# Patient Record
Sex: FEMALE | Race: WHITE | NOT HISPANIC OR LATINO | URBAN - METROPOLITAN AREA
[De-identification: names, ages, dates, MRNs, and addresses within clinical notes are randomized per-mention and may not be internally consistent; named-entity substitution may affect disease eponyms.]

---

## 2017-06-27 ENCOUNTER — IMPORTED ENCOUNTER (OUTPATIENT)
Dept: URBAN - METROPOLITAN AREA CLINIC 50 | Facility: CLINIC | Age: 57
End: 2017-06-27

## 2018-07-17 ENCOUNTER — APPOINTMENT (RX ONLY)
Dept: URBAN - METROPOLITAN AREA CLINIC 57 | Facility: CLINIC | Age: 58
Setting detail: DERMATOLOGY
End: 2018-07-17

## 2018-07-17 DIAGNOSIS — L81.4 OTHER MELANIN HYPERPIGMENTATION: ICD-10-CM

## 2018-07-17 DIAGNOSIS — L57.0 ACTINIC KERATOSIS: ICD-10-CM

## 2018-07-17 DIAGNOSIS — L82.1 OTHER SEBORRHEIC KERATOSIS: ICD-10-CM

## 2018-07-17 DIAGNOSIS — L70.8 OTHER ACNE: ICD-10-CM

## 2018-07-17 DIAGNOSIS — D18.0 HEMANGIOMA: ICD-10-CM

## 2018-07-17 PROBLEM — D18.01 HEMANGIOMA OF SKIN AND SUBCUTANEOUS TISSUE: Status: ACTIVE | Noted: 2018-07-17

## 2018-07-17 PROBLEM — D48.5 NEOPLASM OF UNCERTAIN BEHAVIOR OF SKIN: Status: ACTIVE | Noted: 2018-07-17

## 2018-07-17 PROBLEM — Z85.828 PERSONAL HISTORY OF OTHER MALIGNANT NEOPLASM OF SKIN: Status: ACTIVE | Noted: 2018-07-17

## 2018-07-17 PROCEDURE — ? BIOPSY BY SHAVE METHOD

## 2018-07-17 PROCEDURE — 99213 OFFICE O/P EST LOW 20 MIN: CPT | Mod: 25

## 2018-07-17 PROCEDURE — 17000 DESTRUCT PREMALG LESION: CPT

## 2018-07-17 PROCEDURE — 11100: CPT | Mod: 59

## 2018-07-17 PROCEDURE — ? COUNSELING

## 2018-07-17 PROCEDURE — ? LIQUID NITROGEN

## 2018-07-17 PROCEDURE — 17003 DESTRUCT PREMALG LES 2-14: CPT

## 2018-07-17 ASSESSMENT — LOCATION DETAILED DESCRIPTION DERM
LOCATION DETAILED: LEFT SUPERIOR PREAURICULAR CHEEK
LOCATION DETAILED: LEFT INFERIOR LATERAL FOREHEAD
LOCATION DETAILED: LEFT SUPERIOR LATERAL MALAR CHEEK
LOCATION DETAILED: LEFT INFERIOR LATERAL MALAR CHEEK
LOCATION DETAILED: EPIGASTRIC SKIN
LOCATION DETAILED: LEFT CENTRAL ZYGOMA
LOCATION DETAILED: RIGHT FOREHEAD
LOCATION DETAILED: LEFT MID PREAURICULAR CHEEK
LOCATION DETAILED: LEFT LATERAL TEMPLE

## 2018-07-17 ASSESSMENT — LOCATION SIMPLE DESCRIPTION DERM
LOCATION SIMPLE: RIGHT FOREHEAD
LOCATION SIMPLE: LEFT CHEEK
LOCATION SIMPLE: ABDOMEN
LOCATION SIMPLE: LEFT TEMPLE
LOCATION SIMPLE: LEFT FOREHEAD
LOCATION SIMPLE: LEFT ZYGOMA

## 2018-07-17 ASSESSMENT — LOCATION ZONE DERM
LOCATION ZONE: FACE
LOCATION ZONE: TRUNK

## 2018-07-17 NOTE — PROCEDURE: MIPS QUALITY
Quality 226: Preventive Care And Screening: Tobacco Use: Screening And Cessation Intervention: Patient screened for tobacco and never smoked
Quality 110: Preventive Care And Screening: Influenza Immunization: Influenza Immunization Administered during Influenza season
Detail Level: Detailed
Quality 431: Preventive Care And Screening: Unhealthy Alcohol Use - Screening: Patient screened for unhealthy alcohol use using a single question and scores 2 or greater episodes per year and brief intervention occurred
Quality 111:Pneumonia Vaccination Status For Older Adults: Pneumococcal Vaccination Previously Received

## 2018-07-17 NOTE — PROCEDURE: LIQUID NITROGEN
Duration Of Freeze Thaw-Cycle (Seconds): 5
Consent: The patient's consent was obtained including but not limited to risks of crusting, scabbing, blistering, scarring, darker or lighter pigmentary change, recurrence, incomplete removal and infection.
Detail Level: Zone
Render Post-Care Instructions In Note?: no
Post-Care Instructions: I reviewed with the patient in detail post-care instructions. Patient is to wear sunprotection, and avoid picking at any of the treated lesions. Pt may apply Vaseline to crusted or scabbing areas.
Number Of Freeze-Thaw Cycles: 3 freeze-thaw cycles

## 2018-08-03 ENCOUNTER — APPOINTMENT (RX ONLY)
Dept: URBAN - METROPOLITAN AREA CLINIC 57 | Facility: CLINIC | Age: 58
Setting detail: DERMATOLOGY
End: 2018-08-03

## 2018-08-03 DIAGNOSIS — D22 MELANOCYTIC NEVI: ICD-10-CM

## 2018-08-03 PROBLEM — D22.62 MELANOCYTIC NEVI OF LEFT UPPER LIMB, INCLUDING SHOULDER: Status: ACTIVE | Noted: 2018-08-03

## 2018-08-03 PROCEDURE — 11403 EXC TR-EXT B9+MARG 2.1-3CM: CPT

## 2018-08-03 PROCEDURE — 13121 CMPLX RPR S/A/L 2.6-7.5 CM: CPT

## 2018-08-03 PROCEDURE — ? EXCISION

## 2018-08-03 ASSESSMENT — LOCATION ZONE DERM: LOCATION ZONE: ARM

## 2018-08-03 ASSESSMENT — LOCATION DETAILED DESCRIPTION DERM: LOCATION DETAILED: LEFT DISTAL DORSAL FOREARM

## 2018-08-03 ASSESSMENT — LOCATION SIMPLE DESCRIPTION DERM: LOCATION SIMPLE: LEFT FOREARM

## 2019-01-22 ENCOUNTER — APPOINTMENT (RX ONLY)
Dept: URBAN - METROPOLITAN AREA CLINIC 57 | Facility: CLINIC | Age: 59
Setting detail: DERMATOLOGY
End: 2019-01-22

## 2019-01-22 DIAGNOSIS — L82.1 OTHER SEBORRHEIC KERATOSIS: ICD-10-CM

## 2019-01-22 DIAGNOSIS — L57.0 ACTINIC KERATOSIS: ICD-10-CM

## 2019-01-22 DIAGNOSIS — L81.4 OTHER MELANIN HYPERPIGMENTATION: ICD-10-CM

## 2019-01-22 DIAGNOSIS — D22 MELANOCYTIC NEVI: ICD-10-CM

## 2019-01-22 DIAGNOSIS — Z85.828 PERSONAL HISTORY OF OTHER MALIGNANT NEOPLASM OF SKIN: ICD-10-CM

## 2019-01-22 DIAGNOSIS — L57.3 POIKILODERMA OF CIVATTE: ICD-10-CM

## 2019-01-22 PROBLEM — D22.5 MELANOCYTIC NEVI OF TRUNK: Status: ACTIVE | Noted: 2019-01-22

## 2019-01-22 PROCEDURE — ? COUNSELING

## 2019-01-22 PROCEDURE — ? LIQUID NITROGEN

## 2019-01-22 PROCEDURE — 17003 DESTRUCT PREMALG LES 2-14: CPT

## 2019-01-22 PROCEDURE — 17000 DESTRUCT PREMALG LESION: CPT

## 2019-01-22 PROCEDURE — 99214 OFFICE O/P EST MOD 30 MIN: CPT | Mod: 25

## 2019-01-22 ASSESSMENT — LOCATION ZONE DERM
LOCATION ZONE: TRUNK
LOCATION ZONE: NOSE
LOCATION ZONE: FACE

## 2019-01-22 ASSESSMENT — LOCATION SIMPLE DESCRIPTION DERM
LOCATION SIMPLE: RIGHT ZYGOMA
LOCATION SIMPLE: LEFT CHEEK
LOCATION SIMPLE: UPPER BACK
LOCATION SIMPLE: RIGHT EYEBROW
LOCATION SIMPLE: NOSE

## 2019-01-22 ASSESSMENT — LOCATION DETAILED DESCRIPTION DERM
LOCATION DETAILED: RIGHT CENTRAL EYEBROW
LOCATION DETAILED: LEFT MEDIAL MALAR CHEEK
LOCATION DETAILED: NASAL DORSUM
LOCATION DETAILED: SUPERIOR THORACIC SPINE
LOCATION DETAILED: RIGHT MEDIAL ZYGOMA

## 2019-01-22 NOTE — PROCEDURE: LIQUID NITROGEN
Render Post-Care Instructions In Note?: no
Number Of Freeze-Thaw Cycles: 3 freeze-thaw cycles
Consent: The patient's consent was obtained including but not limited to risks of crusting, scabbing, blistering, scarring, darker or lighter pigmentary change, recurrence, incomplete removal and infection.
Detail Level: Zone
Duration Of Freeze Thaw-Cycle (Seconds): 3
Post-Care Instructions: I reviewed with the patient in detail post-care instructions. Patient is to wear sunprotection, and avoid picking at any of the treated lesions. Pt may apply Vaseline to crusted or scabbing areas.

## 2019-03-15 ENCOUNTER — IMPORTED ENCOUNTER (OUTPATIENT)
Dept: URBAN - METROPOLITAN AREA CLINIC 50 | Facility: CLINIC | Age: 59
End: 2019-03-15

## 2019-03-25 ENCOUNTER — IMPORTED ENCOUNTER (OUTPATIENT)
Dept: URBAN - METROPOLITAN AREA CLINIC 50 | Facility: CLINIC | Age: 59
End: 2019-03-25

## 2019-07-16 ENCOUNTER — APPOINTMENT (RX ONLY)
Dept: URBAN - METROPOLITAN AREA CLINIC 57 | Facility: CLINIC | Age: 59
Setting detail: DERMATOLOGY
End: 2019-07-16

## 2019-07-16 DIAGNOSIS — D18.0 HEMANGIOMA: ICD-10-CM

## 2019-07-16 DIAGNOSIS — L82.1 OTHER SEBORRHEIC KERATOSIS: ICD-10-CM

## 2019-07-16 DIAGNOSIS — Z85.828 PERSONAL HISTORY OF OTHER MALIGNANT NEOPLASM OF SKIN: ICD-10-CM

## 2019-07-16 DIAGNOSIS — L57.8 OTHER SKIN CHANGES DUE TO CHRONIC EXPOSURE TO NONIONIZING RADIATION: ICD-10-CM

## 2019-07-16 DIAGNOSIS — D22 MELANOCYTIC NEVI: ICD-10-CM

## 2019-07-16 DIAGNOSIS — L81.4 OTHER MELANIN HYPERPIGMENTATION: ICD-10-CM

## 2019-07-16 DIAGNOSIS — L57.0 ACTINIC KERATOSIS: ICD-10-CM

## 2019-07-16 PROBLEM — D18.01 HEMANGIOMA OF SKIN AND SUBCUTANEOUS TISSUE: Status: ACTIVE | Noted: 2019-07-16

## 2019-07-16 PROBLEM — D22.9 MELANOCYTIC NEVI, UNSPECIFIED: Status: ACTIVE | Noted: 2019-07-16

## 2019-07-16 PROCEDURE — ? LIQUID NITROGEN

## 2019-07-16 PROCEDURE — 17000 DESTRUCT PREMALG LESION: CPT

## 2019-07-16 PROCEDURE — 17003 DESTRUCT PREMALG LES 2-14: CPT

## 2019-07-16 PROCEDURE — ? COUNSELING

## 2019-07-16 PROCEDURE — ? INVENTORY

## 2019-07-16 PROCEDURE — 99214 OFFICE O/P EST MOD 30 MIN: CPT | Mod: 25

## 2019-07-16 ASSESSMENT — LOCATION DETAILED DESCRIPTION DERM
LOCATION DETAILED: LEFT LATERAL EYEBROW
LOCATION DETAILED: PERIUMBILICAL SKIN
LOCATION DETAILED: RIGHT INFERIOR UPPER BACK
LOCATION DETAILED: RIGHT INFERIOR FOREHEAD
LOCATION DETAILED: LEFT LATERAL MALAR CHEEK
LOCATION DETAILED: PHILTRUM

## 2019-07-16 ASSESSMENT — LOCATION SIMPLE DESCRIPTION DERM
LOCATION SIMPLE: RIGHT UPPER BACK
LOCATION SIMPLE: ABDOMEN
LOCATION SIMPLE: UPPER LIP
LOCATION SIMPLE: LEFT EYEBROW
LOCATION SIMPLE: RIGHT FOREHEAD
LOCATION SIMPLE: LEFT CHEEK

## 2019-07-16 ASSESSMENT — LOCATION ZONE DERM
LOCATION ZONE: TRUNK
LOCATION ZONE: LIP
LOCATION ZONE: FACE

## 2019-07-16 NOTE — PROCEDURE: MIPS QUALITY
Quality 111:Pneumonia Vaccination Status For Older Adults: Pneumococcal Vaccination Previously Received
Quality 226: Preventive Care And Screening: Tobacco Use: Screening And Cessation Intervention: Patient screened for tobacco use and is an ex/non-smoker
Detail Level: Detailed
Quality 130: Documentation Of Current Medications In The Medical Record: Current Medications Documented
Quality 431: Preventive Care And Screening: Unhealthy Alcohol Use - Screening: Patient screened for unhealthy alcohol use using a single question and scores less than 2 times per year
Quality 110: Preventive Care And Screening: Influenza Immunization: Influenza Immunization previously received during influenza season
Quality 474: Zoster Vaccination Status: Shingrix vaccine was not administered for reasons documented by clinician (e.g. patient administered vaccine other than Shingrix, patient allergy or other medical reasons, patient declined or other patient reasons, vaccine not available or other system reasons)

## 2020-01-23 ENCOUNTER — APPOINTMENT (RX ONLY)
Dept: URBAN - METROPOLITAN AREA CLINIC 57 | Facility: CLINIC | Age: 60
Setting detail: DERMATOLOGY
End: 2020-01-23

## 2020-01-23 DIAGNOSIS — L81.4 OTHER MELANIN HYPERPIGMENTATION: ICD-10-CM

## 2020-01-23 DIAGNOSIS — L82.1 OTHER SEBORRHEIC KERATOSIS: ICD-10-CM

## 2020-01-23 DIAGNOSIS — L57.8 OTHER SKIN CHANGES DUE TO CHRONIC EXPOSURE TO NONIONIZING RADIATION: ICD-10-CM

## 2020-01-23 DIAGNOSIS — D18.0 HEMANGIOMA: ICD-10-CM

## 2020-01-23 DIAGNOSIS — L57.0 ACTINIC KERATOSIS: ICD-10-CM

## 2020-01-23 DIAGNOSIS — Z85.828 PERSONAL HISTORY OF OTHER MALIGNANT NEOPLASM OF SKIN: ICD-10-CM

## 2020-01-23 DIAGNOSIS — D22 MELANOCYTIC NEVI: ICD-10-CM

## 2020-01-23 PROBLEM — D22.9 MELANOCYTIC NEVI, UNSPECIFIED: Status: ACTIVE | Noted: 2020-01-23

## 2020-01-23 PROBLEM — D18.01 HEMANGIOMA OF SKIN AND SUBCUTANEOUS TISSUE: Status: ACTIVE | Noted: 2020-01-23

## 2020-01-23 PROCEDURE — 17000 DESTRUCT PREMALG LESION: CPT

## 2020-01-23 PROCEDURE — 99214 OFFICE O/P EST MOD 30 MIN: CPT | Mod: 25

## 2020-01-23 PROCEDURE — ? ADDITIONAL NOTES

## 2020-01-23 PROCEDURE — 17003 DESTRUCT PREMALG LES 2-14: CPT

## 2020-01-23 PROCEDURE — ? LIQUID NITROGEN

## 2020-01-23 PROCEDURE — ? FULL BODY SKIN EXAM

## 2020-01-23 PROCEDURE — ? COUNSELING

## 2020-01-23 ASSESSMENT — LOCATION SIMPLE DESCRIPTION DERM
LOCATION SIMPLE: RIGHT EYEBROW
LOCATION SIMPLE: NOSE
LOCATION SIMPLE: ABDOMEN
LOCATION SIMPLE: LEFT CHEEK
LOCATION SIMPLE: RIGHT ZYGOMA
LOCATION SIMPLE: RIGHT UPPER BACK

## 2020-01-23 ASSESSMENT — LOCATION DETAILED DESCRIPTION DERM
LOCATION DETAILED: RIGHT INFERIOR UPPER BACK
LOCATION DETAILED: RIGHT MEDIAL ZYGOMA
LOCATION DETAILED: LEFT INFERIOR LATERAL MALAR CHEEK
LOCATION DETAILED: LEFT INFERIOR CENTRAL MALAR CHEEK
LOCATION DETAILED: LEFT CENTRAL MALAR CHEEK
LOCATION DETAILED: PERIUMBILICAL SKIN
LOCATION DETAILED: NASAL DORSUM
LOCATION DETAILED: RIGHT EYEBROW
LOCATION DETAILED: LEFT SUPERIOR CENTRAL MALAR CHEEK

## 2020-01-23 ASSESSMENT — LOCATION ZONE DERM
LOCATION ZONE: NOSE
LOCATION ZONE: TRUNK
LOCATION ZONE: FACE

## 2020-01-23 NOTE — PROCEDURE: ADDITIONAL NOTES
Detail Level: Simple
Additional Notes: If lesion on the left cheek doesn’t resolve will biopsy next visit.

## 2020-03-31 ENCOUNTER — IMPORTED ENCOUNTER (OUTPATIENT)
Dept: URBAN - METROPOLITAN AREA CLINIC 50 | Facility: CLINIC | Age: 60
End: 2020-03-31

## 2020-07-20 ENCOUNTER — APPOINTMENT (RX ONLY)
Dept: URBAN - METROPOLITAN AREA CLINIC 57 | Facility: CLINIC | Age: 60
Setting detail: DERMATOLOGY
End: 2020-07-20

## 2020-07-20 DIAGNOSIS — L82.1 OTHER SEBORRHEIC KERATOSIS: ICD-10-CM

## 2020-07-20 DIAGNOSIS — L57.0 ACTINIC KERATOSIS: ICD-10-CM

## 2020-07-20 DIAGNOSIS — L81.4 OTHER MELANIN HYPERPIGMENTATION: ICD-10-CM

## 2020-07-20 DIAGNOSIS — Z85.828 PERSONAL HISTORY OF OTHER MALIGNANT NEOPLASM OF SKIN: ICD-10-CM

## 2020-07-20 DIAGNOSIS — D22 MELANOCYTIC NEVI: ICD-10-CM

## 2020-07-20 DIAGNOSIS — L82.0 INFLAMED SEBORRHEIC KERATOSIS: ICD-10-CM

## 2020-07-20 DIAGNOSIS — D18.0 HEMANGIOMA: ICD-10-CM

## 2020-07-20 DIAGNOSIS — L57.8 OTHER SKIN CHANGES DUE TO CHRONIC EXPOSURE TO NONIONIZING RADIATION: ICD-10-CM

## 2020-07-20 PROBLEM — D22.9 MELANOCYTIC NEVI, UNSPECIFIED: Status: ACTIVE | Noted: 2020-07-20

## 2020-07-20 PROBLEM — D18.01 HEMANGIOMA OF SKIN AND SUBCUTANEOUS TISSUE: Status: ACTIVE | Noted: 2020-07-20

## 2020-07-20 PROCEDURE — 99213 OFFICE O/P EST LOW 20 MIN: CPT | Mod: 25

## 2020-07-20 PROCEDURE — 17110 DESTRUCTION B9 LES UP TO 14: CPT | Mod: 59

## 2020-07-20 PROCEDURE — ? COUNSELING

## 2020-07-20 PROCEDURE — ? FULL BODY SKIN EXAM

## 2020-07-20 PROCEDURE — 17004 DESTROY PREMAL LESIONS 15/>: CPT

## 2020-07-20 PROCEDURE — ? LIQUID NITROGEN

## 2020-07-20 PROCEDURE — ? ADDITIONAL NOTES

## 2020-07-20 ASSESSMENT — LOCATION DETAILED DESCRIPTION DERM
LOCATION DETAILED: RIGHT SUPERIOR CENTRAL MALAR CHEEK
LOCATION DETAILED: RIGHT INFERIOR CENTRAL MALAR CHEEK
LOCATION DETAILED: LEFT LATERAL FOREHEAD
LOCATION DETAILED: LEFT LATERAL EYEBROW
LOCATION DETAILED: LEFT INFERIOR CENTRAL MALAR CHEEK
LOCATION DETAILED: PERIUMBILICAL SKIN
LOCATION DETAILED: LEFT CENTRAL ZYGOMA
LOCATION DETAILED: RIGHT INFERIOR MEDIAL FOREHEAD
LOCATION DETAILED: RIGHT INFERIOR UPPER BACK
LOCATION DETAILED: RIGHT CENTRAL TEMPLE
LOCATION DETAILED: LEFT SUPERIOR LATERAL FOREHEAD
LOCATION DETAILED: LEFT CENTRAL TEMPLE
LOCATION DETAILED: RIGHT MEDIAL TEMPLE
LOCATION DETAILED: LEFT ANTERIOR EARLOBE
LOCATION DETAILED: LEFT MEDIAL MALAR CHEEK
LOCATION DETAILED: RIGHT MEDIAL ZYGOMA
LOCATION DETAILED: RIGHT CENTRAL EYEBROW
LOCATION DETAILED: LEFT SUPERIOR FOREHEAD
LOCATION DETAILED: LEFT MEDIAL TEMPLE

## 2020-07-20 ASSESSMENT — LOCATION SIMPLE DESCRIPTION DERM
LOCATION SIMPLE: LEFT FOREHEAD
LOCATION SIMPLE: RIGHT FOREHEAD
LOCATION SIMPLE: RIGHT CHEEK
LOCATION SIMPLE: RIGHT ZYGOMA
LOCATION SIMPLE: LEFT EAR
LOCATION SIMPLE: RIGHT TEMPLE
LOCATION SIMPLE: LEFT EYEBROW
LOCATION SIMPLE: RIGHT EYEBROW
LOCATION SIMPLE: RIGHT UPPER BACK
LOCATION SIMPLE: LEFT ZYGOMA
LOCATION SIMPLE: LEFT TEMPLE
LOCATION SIMPLE: ABDOMEN
LOCATION SIMPLE: LEFT CHEEK

## 2020-07-20 ASSESSMENT — LOCATION ZONE DERM
LOCATION ZONE: EAR
LOCATION ZONE: TRUNK
LOCATION ZONE: FACE

## 2020-07-20 NOTE — PROCEDURE: LIQUID NITROGEN
Detail Level: Zone
Render Post-Care Instructions In Note?: no
Post-Care Instructions: I reviewed with the patient in detail post-care instructions. Patient is to wear sunprotection, and avoid picking at any of the treated lesions. Pt may apply Vaseline to crusted or scabbing areas.
Duration Of Freeze Thaw-Cycle (Seconds): 0
Consent: The patient's consent was obtained including but not limited to risks of crusting, scabbing, blistering, scarring, darker or lighter pigmentary change, recurrence, incomplete removal and infection.
Include Z78.9 (Other Specified Conditions Influencing Health Status) As An Associated Diagnosis?: Yes
Medical Necessity Clause: This procedure was medically necessary because the lesions that were treated were:
Medical Necessity Information: It is in your best interest to select a reason for this procedure from the list below. All of these items fulfill various CMS LCD requirements except the new and changing color options.
Detail Level: Simple

## 2020-07-20 NOTE — PROCEDURE: MIPS QUALITY
Quality 130: Documentation Of Current Medications In The Medical Record: Current Medications Documented
Quality 431: Preventive Care And Screening: Unhealthy Alcohol Use - Screening: Patient screened for unhealthy alcohol use using a single question and scores less than 2 times per year
Detail Level: Simple
Quality 226: Preventive Care And Screening: Tobacco Use: Screening And Cessation Intervention: Tobacco Screening not Performed for Medical Reasons

## 2021-01-25 ENCOUNTER — APPOINTMENT (RX ONLY)
Dept: URBAN - METROPOLITAN AREA CLINIC 57 | Facility: CLINIC | Age: 61
Setting detail: DERMATOLOGY
End: 2021-01-25

## 2021-01-25 DIAGNOSIS — L57.0 ACTINIC KERATOSIS: ICD-10-CM

## 2021-01-25 DIAGNOSIS — D18.0 HEMANGIOMA: ICD-10-CM

## 2021-01-25 DIAGNOSIS — D22 MELANOCYTIC NEVI: ICD-10-CM

## 2021-01-25 DIAGNOSIS — L82.1 OTHER SEBORRHEIC KERATOSIS: ICD-10-CM

## 2021-01-25 DIAGNOSIS — Z85.828 PERSONAL HISTORY OF OTHER MALIGNANT NEOPLASM OF SKIN: ICD-10-CM

## 2021-01-25 DIAGNOSIS — L81.4 OTHER MELANIN HYPERPIGMENTATION: ICD-10-CM

## 2021-01-25 DIAGNOSIS — L57.8 OTHER SKIN CHANGES DUE TO CHRONIC EXPOSURE TO NONIONIZING RADIATION: ICD-10-CM

## 2021-01-25 PROBLEM — D18.01 HEMANGIOMA OF SKIN AND SUBCUTANEOUS TISSUE: Status: ACTIVE | Noted: 2021-01-25

## 2021-01-25 PROBLEM — D22.9 MELANOCYTIC NEVI, UNSPECIFIED: Status: ACTIVE | Noted: 2021-01-25

## 2021-01-25 PROCEDURE — 17003 DESTRUCT PREMALG LES 2-14: CPT

## 2021-01-25 PROCEDURE — ? TREATMENT REGIMEN

## 2021-01-25 PROCEDURE — ? COUNSELING

## 2021-01-25 PROCEDURE — 99213 OFFICE O/P EST LOW 20 MIN: CPT | Mod: 25

## 2021-01-25 PROCEDURE — 17000 DESTRUCT PREMALG LESION: CPT

## 2021-01-25 PROCEDURE — ? LIQUID NITROGEN

## 2021-01-25 PROCEDURE — ? ADDITIONAL NOTES

## 2021-01-25 PROCEDURE — ? FULL BODY SKIN EXAM

## 2021-01-25 ASSESSMENT — LOCATION DETAILED DESCRIPTION DERM
LOCATION DETAILED: LEFT INFERIOR LATERAL MALAR CHEEK
LOCATION DETAILED: PERIUMBILICAL SKIN
LOCATION DETAILED: LEFT LATERAL BUCCAL CHEEK
LOCATION DETAILED: LEFT CENTRAL MANDIBULAR CHEEK
LOCATION DETAILED: RIGHT ANTERIOR SHOULDER
LOCATION DETAILED: RIGHT SUPERIOR LATERAL BUCCAL CHEEK
LOCATION DETAILED: LEFT CENTRAL ZYGOMA
LOCATION DETAILED: RIGHT INFERIOR UPPER BACK
LOCATION DETAILED: LEFT SUPERIOR CENTRAL BUCCAL CHEEK
LOCATION DETAILED: LEFT CENTRAL TEMPLE
LOCATION DETAILED: LEFT LATERAL TEMPLE
LOCATION DETAILED: LEFT SUPERIOR FOREHEAD
LOCATION DETAILED: INFERIOR THORACIC SPINE
LOCATION DETAILED: RIGHT INFERIOR TEMPLE

## 2021-01-25 ASSESSMENT — LOCATION SIMPLE DESCRIPTION DERM
LOCATION SIMPLE: RIGHT SHOULDER
LOCATION SIMPLE: RIGHT CHEEK
LOCATION SIMPLE: LEFT FOREHEAD
LOCATION SIMPLE: UPPER BACK
LOCATION SIMPLE: LEFT CHEEK
LOCATION SIMPLE: ABDOMEN
LOCATION SIMPLE: RIGHT TEMPLE
LOCATION SIMPLE: LEFT ZYGOMA
LOCATION SIMPLE: RIGHT UPPER BACK
LOCATION SIMPLE: LEFT TEMPLE

## 2021-01-25 ASSESSMENT — LOCATION ZONE DERM
LOCATION ZONE: FACE
LOCATION ZONE: ARM
LOCATION ZONE: TRUNK

## 2021-03-25 ENCOUNTER — IMPORTED ENCOUNTER (OUTPATIENT)
Dept: URBAN - METROPOLITAN AREA CLINIC 50 | Facility: CLINIC | Age: 61
End: 2021-03-25

## 2021-04-17 ASSESSMENT — VISUAL ACUITY
OD_CC: 20/30-2
OD_CC: J1+
OS_CC: 20/30-2
OD_BAT: 20/40
OS_CC: J1+@ 15 IN
OD_CC: 20/25-3
OS_CC: 20/20
OS_OTHER: 20/40. >20/400.
OD_CC: 20/25
OD_CC: 20/20-2
OD_CC: J1+@ 17 IN
OS_CC: 20/30-1+1
OS_BAT: 20/40
OS_CC: J1+@ 18 IN
OD_BAT: 20/30
OD_OTHER: 20/30. >20/400.
OS_CC: J1+@ 17 IN
OS_OTHER: 20/40. 20/70.
OD_OTHER: 20/40. 20/100.
OS_BAT: 20/40
OD_CC: J1+@ 15 IN
OS_CC: 20/25-2
OD_CC: J1+@ 18 IN
OS_CC: J1+

## 2021-04-17 ASSESSMENT — PACHYMETRY
OS_CT_UM: 474
OS_CT_UM: 474
OD_CT_UM: 467
OS_CT_UM: 474
OD_CT_UM: 467
OS_CT_UM: 474
OD_CT_UM: 467
OD_CT_UM: 467

## 2021-04-17 ASSESSMENT — TONOMETRY
OD_IOP_MMHG: 18
OS_IOP_MMHG: 15
OD_IOP_MMHG: 13
OS_IOP_MMHG: 15
OS_IOP_MMHG: 12
OD_IOP_MMHG: 19
OS_IOP_MMHG: 19
OS_IOP_MMHG: 13
OS_IOP_MMHG: 19
OD_IOP_MMHG: 14
OD_IOP_MMHG: 11
OD_IOP_MMHG: 15

## 2021-05-13 ENCOUNTER — APPOINTMENT (RX ONLY)
Dept: URBAN - METROPOLITAN AREA CLINIC 57 | Facility: CLINIC | Age: 61
Setting detail: DERMATOLOGY
End: 2021-05-13

## 2021-05-13 DIAGNOSIS — L82.1 OTHER SEBORRHEIC KERATOSIS: ICD-10-CM

## 2021-05-13 DIAGNOSIS — D18.0 HEMANGIOMA: ICD-10-CM

## 2021-05-13 DIAGNOSIS — L57.8 OTHER SKIN CHANGES DUE TO CHRONIC EXPOSURE TO NONIONIZING RADIATION: ICD-10-CM

## 2021-05-13 DIAGNOSIS — Z85.828 PERSONAL HISTORY OF OTHER MALIGNANT NEOPLASM OF SKIN: ICD-10-CM

## 2021-05-13 DIAGNOSIS — L81.4 OTHER MELANIN HYPERPIGMENTATION: ICD-10-CM

## 2021-05-13 DIAGNOSIS — D22 MELANOCYTIC NEVI: ICD-10-CM

## 2021-05-13 DIAGNOSIS — L57.0 ACTINIC KERATOSIS: ICD-10-CM

## 2021-05-13 PROBLEM — D48.5 NEOPLASM OF UNCERTAIN BEHAVIOR OF SKIN: Status: ACTIVE | Noted: 2021-05-13

## 2021-05-13 PROBLEM — D18.01 HEMANGIOMA OF SKIN AND SUBCUTANEOUS TISSUE: Status: ACTIVE | Noted: 2021-05-13

## 2021-05-13 PROBLEM — D22.9 MELANOCYTIC NEVI, UNSPECIFIED: Status: ACTIVE | Noted: 2021-05-13

## 2021-05-13 PROCEDURE — ? FULL BODY SKIN EXAM - DECLINED

## 2021-05-13 PROCEDURE — ? LIQUID NITROGEN

## 2021-05-13 PROCEDURE — 17000 DESTRUCT PREMALG LESION: CPT | Mod: 59

## 2021-05-13 PROCEDURE — 99213 OFFICE O/P EST LOW 20 MIN: CPT | Mod: 25

## 2021-05-13 PROCEDURE — ? TREATMENT REGIMEN

## 2021-05-13 PROCEDURE — ? BIOPSY BY SHAVE METHOD

## 2021-05-13 PROCEDURE — ? FULL BODY SKIN EXAM

## 2021-05-13 PROCEDURE — 17003 DESTRUCT PREMALG LES 2-14: CPT

## 2021-05-13 PROCEDURE — ? COUNSELING

## 2021-05-13 PROCEDURE — 11102 TANGNTL BX SKIN SINGLE LES: CPT

## 2021-05-13 PROCEDURE — ? ADDITIONAL NOTES

## 2021-05-13 ASSESSMENT — LOCATION SIMPLE DESCRIPTION DERM
LOCATION SIMPLE: RIGHT FOREHEAD
LOCATION SIMPLE: RIGHT UPPER BACK
LOCATION SIMPLE: ABDOMEN
LOCATION SIMPLE: RIGHT TEMPLE
LOCATION SIMPLE: RIGHT CHEEK
LOCATION SIMPLE: UPPER BACK
LOCATION SIMPLE: LEFT EAR
LOCATION SIMPLE: NOSE
LOCATION SIMPLE: LEFT CHEEK

## 2021-05-13 ASSESSMENT — LOCATION DETAILED DESCRIPTION DERM
LOCATION DETAILED: LEFT INFERIOR LATERAL MALAR CHEEK
LOCATION DETAILED: RIGHT CENTRAL TEMPLE
LOCATION DETAILED: RIGHT CENTRAL BUCCAL CHEEK
LOCATION DETAILED: RIGHT FOREHEAD
LOCATION DETAILED: PERIUMBILICAL SKIN
LOCATION DETAILED: INFERIOR THORACIC SPINE
LOCATION DETAILED: LEFT INFERIOR HELIX
LOCATION DETAILED: RIGHT INFERIOR CENTRAL MALAR CHEEK
LOCATION DETAILED: NASAL DORSUM
LOCATION DETAILED: LEFT LATERAL MALAR CHEEK
LOCATION DETAILED: RIGHT INFERIOR UPPER BACK
LOCATION DETAILED: LEFT ANTERIOR EARLOBE

## 2021-05-13 ASSESSMENT — LOCATION ZONE DERM
LOCATION ZONE: TRUNK
LOCATION ZONE: NOSE
LOCATION ZONE: EAR
LOCATION ZONE: FACE

## 2021-06-09 ENCOUNTER — APPOINTMENT (RX ONLY)
Dept: URBAN - METROPOLITAN AREA CLINIC 56 | Facility: CLINIC | Age: 61
Setting detail: DERMATOLOGY
End: 2021-06-09

## 2021-06-09 VITALS — DIASTOLIC BLOOD PRESSURE: 66 MMHG | SYSTOLIC BLOOD PRESSURE: 133 MMHG | HEART RATE: 61 BPM

## 2021-06-09 PROBLEM — C44.629 SQUAMOUS CELL CARCINOMA OF SKIN OF LEFT UPPER LIMB, INCLUDING SHOULDER: Status: ACTIVE | Noted: 2021-06-09

## 2021-06-09 PROCEDURE — ? MOHS SURGERY

## 2021-06-09 PROCEDURE — 17311 MOHS 1 STAGE H/N/HF/G: CPT

## 2021-06-09 PROCEDURE — ? REPAIR NOTE

## 2021-06-09 PROCEDURE — ? ADDITIONAL NOTES

## 2021-06-09 PROCEDURE — 13132 CMPLX RPR F/C/C/M/N/AX/G/H/F: CPT

## 2021-06-09 NOTE — PROCEDURE: MOHS SURGERY
Referred To Mid-Level For Closure Text (Leave Blank If You Do Not Want): After obtaining clear surgical margins the patient was sent to Pierce Epstein PA-C for surgical repair.  The patient understands they will receive post-surgical care and follow-up from the mid-level provider.

## 2021-06-09 NOTE — PROCEDURE: MOHS SURGERY
Referred To Plastics For Closure Text (Leave Blank If You Do Not Want): After obtaining clear surgical margins the patient was sent to plastics for surgical repair.  The patient understands they will receive post-surgical care and follow-up from Dr. Kumari.

## 2021-09-08 ENCOUNTER — APPOINTMENT (RX ONLY)
Dept: URBAN - METROPOLITAN AREA CLINIC 57 | Facility: CLINIC | Age: 61
Setting detail: DERMATOLOGY
End: 2021-09-08

## 2021-09-08 DIAGNOSIS — D18.0 HEMANGIOMA: ICD-10-CM

## 2021-09-08 DIAGNOSIS — Z85.828 PERSONAL HISTORY OF OTHER MALIGNANT NEOPLASM OF SKIN: ICD-10-CM

## 2021-09-08 DIAGNOSIS — L57.0 ACTINIC KERATOSIS: ICD-10-CM

## 2021-09-08 PROBLEM — D18.01 HEMANGIOMA OF SKIN AND SUBCUTANEOUS TISSUE: Status: ACTIVE | Noted: 2021-09-08

## 2021-09-08 PROCEDURE — ? FULL BODY SKIN EXAM - DECLINED

## 2021-09-08 PROCEDURE — ? ADDITIONAL NOTES

## 2021-09-08 PROCEDURE — ? COUNSELING

## 2021-09-08 PROCEDURE — 17000 DESTRUCT PREMALG LESION: CPT

## 2021-09-08 PROCEDURE — 99212 OFFICE O/P EST SF 10 MIN: CPT | Mod: 25

## 2021-09-08 PROCEDURE — ? LIQUID NITROGEN

## 2021-09-08 PROCEDURE — 17003 DESTRUCT PREMALG LES 2-14: CPT

## 2021-09-08 ASSESSMENT — LOCATION DETAILED DESCRIPTION DERM
LOCATION DETAILED: RIGHT INFERIOR LATERAL MALAR CHEEK
LOCATION DETAILED: RIGHT PROXIMAL POSTERIOR THIGH
LOCATION DETAILED: LEFT INFERIOR CENTRAL MALAR CHEEK

## 2021-09-08 ASSESSMENT — LOCATION ZONE DERM
LOCATION ZONE: LEG
LOCATION ZONE: FACE

## 2021-09-08 ASSESSMENT — LOCATION SIMPLE DESCRIPTION DERM
LOCATION SIMPLE: RIGHT CHEEK
LOCATION SIMPLE: LEFT CHEEK
LOCATION SIMPLE: RIGHT POSTERIOR THIGH

## 2021-09-08 NOTE — PROCEDURE: MIPS QUALITY
Quality 47: Advance Care Plan: Advance Care Planning discussed and documented; advance care plan or surrogate decision maker documented in the medical record.
Quality 110: Preventive Care And Screening: Influenza Immunization: Influenza Immunization previously received during influenza season
Quality 226: Preventive Care And Screening: Tobacco Use: Screening And Cessation Intervention: Patient screened for tobacco use and is an ex/non-smoker
Quality 130: Documentation Of Current Medications In The Medical Record: Current Medications Documented
Detail Level: Detailed
Quality 431: Preventive Care And Screening: Unhealthy Alcohol Use - Screening: Patient not identified as an unhealthy alcohol user when screened for unhealthy alcohol use using a systematic screening method

## 2021-09-08 NOTE — PROCEDURE: LIQUID NITROGEN
Render Post-Care Instructions In Note?: no
Show Aperture Variable?: Yes
Consent: The patient's consent was obtained including but not limited to risks of crusting, scabbing, blistering, scarring, darker or lighter pigmentary change, recurrence, incomplete removal and infection.
Duration Of Freeze Thaw-Cycle (Seconds): 0
Number Of Freeze-Thaw Cycles: 2 freeze-thaw cycles
Detail Level: Detailed
Post-Care Instructions: I reviewed with the patient in detail post-care instructions. Patient is to wear sunprotection, and avoid picking at any of the treated lesions. Pt may apply Vaseline to crusted or scabbing areas.

## 2021-11-08 ENCOUNTER — APPOINTMENT (RX ONLY)
Dept: URBAN - METROPOLITAN AREA CLINIC 57 | Facility: CLINIC | Age: 61
Setting detail: DERMATOLOGY
End: 2021-11-08

## 2021-11-08 DIAGNOSIS — L57.0 ACTINIC KERATOSIS: ICD-10-CM

## 2021-11-08 DIAGNOSIS — L81.4 OTHER MELANIN HYPERPIGMENTATION: ICD-10-CM

## 2021-11-08 DIAGNOSIS — D22 MELANOCYTIC NEVI: ICD-10-CM

## 2021-11-08 DIAGNOSIS — L82.1 OTHER SEBORRHEIC KERATOSIS: ICD-10-CM

## 2021-11-08 DIAGNOSIS — Z12.83 ENCOUNTER FOR SCREENING FOR MALIGNANT NEOPLASM OF SKIN: ICD-10-CM

## 2021-11-08 DIAGNOSIS — D18.0 HEMANGIOMA: ICD-10-CM

## 2021-11-08 PROBLEM — D18.01 HEMANGIOMA OF SKIN AND SUBCUTANEOUS TISSUE: Status: ACTIVE | Noted: 2021-11-08

## 2021-11-08 PROBLEM — D22.5 MELANOCYTIC NEVI OF TRUNK: Status: ACTIVE | Noted: 2021-11-08

## 2021-11-08 PROCEDURE — ? ADDITIONAL NOTES

## 2021-11-08 PROCEDURE — ? SUNSCREEN RECOMMENDATIONS

## 2021-11-08 PROCEDURE — 17004 DESTROY PREMAL LESIONS 15/>: CPT

## 2021-11-08 PROCEDURE — ? COUNSELING

## 2021-11-08 PROCEDURE — ? LIQUID NITROGEN

## 2021-11-08 PROCEDURE — 99213 OFFICE O/P EST LOW 20 MIN: CPT | Mod: 25

## 2021-11-08 ASSESSMENT — LOCATION DETAILED DESCRIPTION DERM
LOCATION DETAILED: LEFT INFERIOR FOREHEAD
LOCATION DETAILED: LEFT INFERIOR LATERAL MALAR CHEEK
LOCATION DETAILED: LEFT FOREHEAD
LOCATION DETAILED: LEFT PROXIMAL DORSAL FOREARM
LOCATION DETAILED: NASAL DORSUM
LOCATION DETAILED: LEFT LATERAL BUCCAL CHEEK
LOCATION DETAILED: PERIUMBILICAL SKIN
LOCATION DETAILED: LEFT SUPERIOR LATERAL NECK
LOCATION DETAILED: RIGHT RADIAL DORSAL HAND
LOCATION DETAILED: RIGHT SUPERIOR MEDIAL BUCCAL CHEEK
LOCATION DETAILED: RIGHT FOREHEAD
LOCATION DETAILED: RIGHT INFERIOR CENTRAL MALAR CHEEK
LOCATION DETAILED: LEFT LATERAL ZYGOMA
LOCATION DETAILED: RIGHT SUPERIOR LATERAL NECK
LOCATION DETAILED: RIGHT INFERIOR LATERAL MALAR CHEEK
LOCATION DETAILED: RIGHT MEDIAL TEMPLE
LOCATION DETAILED: EPIGASTRIC SKIN
LOCATION DETAILED: LEFT ANTITRAGUS
LOCATION DETAILED: LEFT CENTRAL MALAR CHEEK
LOCATION DETAILED: LEFT INFERIOR MEDIAL UPPER BACK
LOCATION DETAILED: LEFT MEDIAL UPPER BACK
LOCATION DETAILED: RIGHT MEDIAL UPPER BACK
LOCATION DETAILED: RIGHT LATERAL BUCCAL CHEEK
LOCATION DETAILED: LEFT INFERIOR LATERAL FOREHEAD
LOCATION DETAILED: RIGHT CENTRAL ZYGOMA
LOCATION DETAILED: LEFT SUPERIOR CENTRAL BUCCAL CHEEK

## 2021-11-08 ASSESSMENT — LOCATION SIMPLE DESCRIPTION DERM
LOCATION SIMPLE: LEFT FOREARM
LOCATION SIMPLE: RIGHT FOREHEAD
LOCATION SIMPLE: RIGHT ZYGOMA
LOCATION SIMPLE: RIGHT TEMPLE
LOCATION SIMPLE: RIGHT CHEEK
LOCATION SIMPLE: ABDOMEN
LOCATION SIMPLE: NECK
LOCATION SIMPLE: RIGHT UPPER BACK
LOCATION SIMPLE: NOSE
LOCATION SIMPLE: LEFT UPPER BACK
LOCATION SIMPLE: RIGHT HAND
LOCATION SIMPLE: LEFT ZYGOMA
LOCATION SIMPLE: LEFT FOREHEAD
LOCATION SIMPLE: LEFT EAR
LOCATION SIMPLE: LEFT CHEEK

## 2021-11-08 ASSESSMENT — LOCATION ZONE DERM
LOCATION ZONE: HAND
LOCATION ZONE: TRUNK
LOCATION ZONE: NECK
LOCATION ZONE: EAR
LOCATION ZONE: NOSE
LOCATION ZONE: FACE
LOCATION ZONE: ARM

## 2022-02-21 NOTE — PATIENT DISCUSSION
Mom calling 2/18 @ 4:06. Mom states she was instructed to call back with changes. Keri Duran now has fever and more diarrhea. No change to Rx.

## 2022-03-11 ASSESSMENT — VISUAL ACUITY
OU_CC: 20/20
OS_CC: 20/20
OD_CC: 20/25

## 2022-03-14 ENCOUNTER — PREPPED CHART (OUTPATIENT)
Dept: URBAN - METROPOLITAN AREA CLINIC 53 | Facility: CLINIC | Age: 62
End: 2022-03-14

## 2022-05-09 ENCOUNTER — APPOINTMENT (RX ONLY)
Dept: URBAN - METROPOLITAN AREA CLINIC 57 | Facility: CLINIC | Age: 62
Setting detail: DERMATOLOGY
End: 2022-05-09

## 2022-05-09 DIAGNOSIS — L81.4 OTHER MELANIN HYPERPIGMENTATION: ICD-10-CM

## 2022-05-09 DIAGNOSIS — L57.8 OTHER SKIN CHANGES DUE TO CHRONIC EXPOSURE TO NONIONIZING RADIATION: ICD-10-CM

## 2022-05-09 DIAGNOSIS — L57.0 ACTINIC KERATOSIS: ICD-10-CM

## 2022-05-09 DIAGNOSIS — L82.1 OTHER SEBORRHEIC KERATOSIS: ICD-10-CM

## 2022-05-09 DIAGNOSIS — D22 MELANOCYTIC NEVI: ICD-10-CM

## 2022-05-09 DIAGNOSIS — D18.0 HEMANGIOMA: ICD-10-CM

## 2022-05-09 DIAGNOSIS — Z85.828 PERSONAL HISTORY OF OTHER MALIGNANT NEOPLASM OF SKIN: ICD-10-CM

## 2022-05-09 PROBLEM — D22.9 MELANOCYTIC NEVI, UNSPECIFIED: Status: ACTIVE | Noted: 2022-05-09

## 2022-05-09 PROBLEM — D18.01 HEMANGIOMA OF SKIN AND SUBCUTANEOUS TISSUE: Status: ACTIVE | Noted: 2022-05-09

## 2022-05-09 PROCEDURE — ? COUNSELING

## 2022-05-09 PROCEDURE — ? TREATMENT REGIMEN

## 2022-05-09 PROCEDURE — 99213 OFFICE O/P EST LOW 20 MIN: CPT

## 2022-05-09 PROCEDURE — ? ADDITIONAL NOTES

## 2022-05-09 PROCEDURE — ? FULL BODY SKIN EXAM

## 2022-05-09 ASSESSMENT — LOCATION DETAILED DESCRIPTION DERM
LOCATION DETAILED: RIGHT INFERIOR UPPER BACK
LOCATION DETAILED: PERIUMBILICAL SKIN
LOCATION DETAILED: INFERIOR MID FOREHEAD
LOCATION DETAILED: INFERIOR THORACIC SPINE
LOCATION DETAILED: 2ND WEB SPACE LEFT HAND
LOCATION DETAILED: LEFT SUPERIOR UPPER BACK

## 2022-05-09 ASSESSMENT — LOCATION SIMPLE DESCRIPTION DERM
LOCATION SIMPLE: UPPER BACK
LOCATION SIMPLE: ABDOMEN
LOCATION SIMPLE: LEFT UPPER BACK
LOCATION SIMPLE: LEFT HAND
LOCATION SIMPLE: RIGHT UPPER BACK
LOCATION SIMPLE: INFERIOR FOREHEAD

## 2022-05-09 ASSESSMENT — LOCATION ZONE DERM
LOCATION ZONE: TRUNK
LOCATION ZONE: HAND
LOCATION ZONE: FACE

## 2022-05-09 NOTE — PROCEDURE: ADDITIONAL NOTES
Detail Level: Simple
Additional Notes: Patient consent was obtained to proceed with the visit and recommended plan of care after discussion of all risks and benefits, including the risks of COVID-19 exposure.
Render Risk Assessment In Note?: yes
Detail Level: Zone
Render Risk Assessment In Note?: no
Additional Notes: Discussed blue light vs efudex on face.

## 2022-05-26 ENCOUNTER — APPOINTMENT (RX ONLY)
Dept: URBAN - METROPOLITAN AREA CLINIC 57 | Facility: CLINIC | Age: 62
Setting detail: DERMATOLOGY
End: 2022-05-26

## 2022-05-26 DIAGNOSIS — L57.0 ACTINIC KERATOSIS: ICD-10-CM

## 2022-05-26 PROCEDURE — ? ADDITIONAL NOTES

## 2022-05-26 PROCEDURE — 96573 PDT DSTR PRMLG LES PHYS/QHP: CPT

## 2022-05-26 PROCEDURE — ? PDT: BLUE

## 2022-05-26 PROCEDURE — ? FULL BODY SKIN EXAM - DECLINED

## 2022-05-26 ASSESSMENT — LOCATION ZONE DERM: LOCATION ZONE: FACE

## 2022-05-26 ASSESSMENT — LOCATION SIMPLE DESCRIPTION DERM: LOCATION SIMPLE: LEFT FOREHEAD

## 2022-05-26 ASSESSMENT — LOCATION DETAILED DESCRIPTION DERM: LOCATION DETAILED: LEFT INFERIOR MEDIAL FOREHEAD

## 2022-05-26 NOTE — PROCEDURE: PDT: BLUE
Who Performed The Pdt?: Performed by MD JOSE, BRENDA or NP ([id number]) Who Performed The Pdt?: Performed by MD JOSE, BRENDA or NP ([id number])

## 2022-06-23 ENCOUNTER — APPOINTMENT (RX ONLY)
Dept: URBAN - METROPOLITAN AREA CLINIC 57 | Facility: CLINIC | Age: 62
Setting detail: DERMATOLOGY
End: 2022-06-23

## 2022-06-23 DIAGNOSIS — L57.0 ACTINIC KERATOSIS: ICD-10-CM | Status: IMPROVED

## 2022-06-23 PROCEDURE — ? ADDITIONAL NOTES

## 2022-06-23 PROCEDURE — 99213 OFFICE O/P EST LOW 20 MIN: CPT

## 2022-06-23 PROCEDURE — ? COUNSELING

## 2022-06-23 PROCEDURE — ? FULL BODY SKIN EXAM - DECLINED

## 2022-06-23 ASSESSMENT — LOCATION DETAILED DESCRIPTION DERM: LOCATION DETAILED: INFERIOR MID FOREHEAD

## 2022-06-23 ASSESSMENT — LOCATION SIMPLE DESCRIPTION DERM: LOCATION SIMPLE: INFERIOR FOREHEAD

## 2022-06-23 ASSESSMENT — LOCATION ZONE DERM: LOCATION ZONE: FACE

## 2022-11-02 ENCOUNTER — COMPREHENSIVE EXAM (OUTPATIENT)
Dept: URBAN - METROPOLITAN AREA CLINIC 48 | Facility: LOCATION | Age: 62
End: 2022-11-02

## 2022-11-02 DIAGNOSIS — H04.123: ICD-10-CM

## 2022-11-02 DIAGNOSIS — Z01.01: ICD-10-CM

## 2022-11-02 DIAGNOSIS — H43.813: ICD-10-CM

## 2022-11-02 DIAGNOSIS — H25.813: ICD-10-CM

## 2022-11-02 DIAGNOSIS — H35.3111: ICD-10-CM

## 2022-11-02 DIAGNOSIS — H40.013: ICD-10-CM

## 2022-11-02 DIAGNOSIS — H52.4: ICD-10-CM

## 2022-11-02 PROCEDURE — 92014 COMPRE OPH EXAM EST PT 1/>: CPT

## 2022-11-02 PROCEDURE — 92134 CPTRZ OPH DX IMG PST SGM RTA: CPT

## 2022-11-02 PROCEDURE — 92015 DETERMINE REFRACTIVE STATE: CPT

## 2022-11-02 ASSESSMENT — VISUAL ACUITY
OD_CC: 20/25-1
OS_CC: 20/25-1
OS_GLARE: 20/25
OD_GLARE: 20/25
OU_CC: J1+

## 2022-11-02 ASSESSMENT — TONOMETRY
OS_IOP_MMHG: 18
OS_IOP_MMHG: 14
OD_IOP_MMHG: 18
OD_IOP_MMHG: 14

## 2022-11-08 ENCOUNTER — CONTACT LENSES/GLASSES VISIT (OUTPATIENT)
Dept: URBAN - METROPOLITAN AREA CLINIC 48 | Facility: LOCATION | Age: 62
End: 2022-11-08

## 2022-11-08 DIAGNOSIS — H52.4: ICD-10-CM

## 2022-11-08 PROCEDURE — 92015 DETERMINE REFRACTIVE STATE: CPT

## 2022-11-08 ASSESSMENT — VISUAL ACUITY
OU_CC: 20/30
OU_CC: J1+ @ 15IN
OD_CC: 20/40-1
OS_CC: 20/30-1

## 2022-11-14 ENCOUNTER — APPOINTMENT (RX ONLY)
Dept: URBAN - METROPOLITAN AREA CLINIC 57 | Facility: CLINIC | Age: 62
Setting detail: DERMATOLOGY
End: 2022-11-14

## 2022-11-14 DIAGNOSIS — D22 MELANOCYTIC NEVI: ICD-10-CM

## 2022-11-14 DIAGNOSIS — Z87.2 PERSONAL HISTORY OF DISEASES OF THE SKIN AND SUBCUTANEOUS TISSUE: ICD-10-CM | Status: RESOLVED

## 2022-11-14 DIAGNOSIS — D18.0 HEMANGIOMA: ICD-10-CM

## 2022-11-14 DIAGNOSIS — L82.1 OTHER SEBORRHEIC KERATOSIS: ICD-10-CM

## 2022-11-14 DIAGNOSIS — Z12.83 ENCOUNTER FOR SCREENING FOR MALIGNANT NEOPLASM OF SKIN: ICD-10-CM

## 2022-11-14 DIAGNOSIS — L57.0 ACTINIC KERATOSIS: ICD-10-CM

## 2022-11-14 DIAGNOSIS — Z85.828 PERSONAL HISTORY OF OTHER MALIGNANT NEOPLASM OF SKIN: ICD-10-CM | Status: RESOLVED

## 2022-11-14 DIAGNOSIS — L81.4 OTHER MELANIN HYPERPIGMENTATION: ICD-10-CM

## 2022-11-14 PROBLEM — D18.01 HEMANGIOMA OF SKIN AND SUBCUTANEOUS TISSUE: Status: ACTIVE | Noted: 2022-11-14

## 2022-11-14 PROBLEM — D22.5 MELANOCYTIC NEVI OF TRUNK: Status: ACTIVE | Noted: 2022-11-14

## 2022-11-14 PROCEDURE — 17004 DESTROY PREMAL LESIONS 15/>: CPT

## 2022-11-14 PROCEDURE — ? LIQUID NITROGEN

## 2022-11-14 PROCEDURE — 99213 OFFICE O/P EST LOW 20 MIN: CPT | Mod: 25

## 2022-11-14 PROCEDURE — ? SUNSCREEN RECOMMENDATIONS

## 2022-11-14 PROCEDURE — ? FULL BODY SKIN EXAM

## 2022-11-14 PROCEDURE — ? COUNSELING

## 2022-11-14 ASSESSMENT — LOCATION DETAILED DESCRIPTION DERM
LOCATION DETAILED: RIGHT INFERIOR CENTRAL MALAR CHEEK
LOCATION DETAILED: LEFT CENTRAL ZYGOMA
LOCATION DETAILED: LEFT PROXIMAL RADIAL DORSAL FOREARM
LOCATION DETAILED: RIGHT LATERAL MALAR CHEEK
LOCATION DETAILED: RIGHT MEDIAL UPPER BACK
LOCATION DETAILED: LEFT INFERIOR LATERAL MALAR CHEEK
LOCATION DETAILED: LEFT INFERIOR MEDIAL UPPER BACK
LOCATION DETAILED: LEFT INFERIOR PREAURICULAR CHEEK
LOCATION DETAILED: RIGHT SUPERIOR LATERAL MALAR CHEEK
LOCATION DETAILED: LEFT SUPERIOR UPPER BACK
LOCATION DETAILED: LEFT DISTAL DORSAL FOREARM
LOCATION DETAILED: PERIUMBILICAL SKIN
LOCATION DETAILED: 2ND WEB SPACE LEFT HAND
LOCATION DETAILED: LEFT LATERAL TEMPLE
LOCATION DETAILED: LEFT MEDIAL UPPER BACK
LOCATION DETAILED: LEFT INFERIOR CENTRAL MALAR CHEEK
LOCATION DETAILED: LEFT LATERAL FOREHEAD
LOCATION DETAILED: EPIGASTRIC SKIN
LOCATION DETAILED: LEFT LATERAL MALAR CHEEK
LOCATION DETAILED: LEFT CENTRAL TEMPLE
LOCATION DETAILED: LEFT SUPERIOR FOREHEAD
LOCATION DETAILED: LEFT SUPERIOR PREAURICULAR CHEEK

## 2022-11-14 ASSESSMENT — LOCATION SIMPLE DESCRIPTION DERM
LOCATION SIMPLE: LEFT UPPER BACK
LOCATION SIMPLE: LEFT ZYGOMA
LOCATION SIMPLE: LEFT TEMPLE
LOCATION SIMPLE: ABDOMEN
LOCATION SIMPLE: LEFT FOREARM
LOCATION SIMPLE: LEFT HAND
LOCATION SIMPLE: LEFT CHEEK
LOCATION SIMPLE: LEFT FOREHEAD
LOCATION SIMPLE: RIGHT CHEEK
LOCATION SIMPLE: RIGHT UPPER BACK

## 2022-11-14 ASSESSMENT — LOCATION ZONE DERM
LOCATION ZONE: ARM
LOCATION ZONE: TRUNK
LOCATION ZONE: HAND
LOCATION ZONE: FACE

## 2022-11-14 NOTE — PROCEDURE: LIQUID NITROGEN
Render Post-Care Instructions In Note?: no
Post-Care Instructions: I reviewed with the patient in detail post-care instructions. Patient is to wear sunprotection, and avoid picking at any of the treated lesions. Pt may apply Vaseline to crusted or scabbing areas.
Show Applicator Variable?: Yes
Duration Of Freeze Thaw-Cycle (Seconds): 2
Application Tool (Optional): Liquid Nitrogen Sprayer
Consent: The patient's consent was obtained including but not limited to risks of crusting, scabbing, blistering, scarring, darker or lighter pigmentary change, recurrence, incomplete removal and infection.
Detail Level: Detailed
Number Of Freeze-Thaw Cycles: 1 freeze-thaw cycle

## 2022-11-28 ENCOUNTER — DIAGNOSTICS ONLY (OUTPATIENT)
Dept: URBAN - METROPOLITAN AREA CLINIC 48 | Facility: LOCATION | Age: 62
End: 2022-11-28

## 2022-11-28 DIAGNOSIS — H40.013: ICD-10-CM

## 2022-11-28 PROCEDURE — 92083 EXTENDED VISUAL FIELD XM: CPT

## 2022-11-28 PROCEDURE — 92133 CPTRZD OPH DX IMG PST SGM ON: CPT

## 2023-03-23 NOTE — PROCEDURE: MOHS SURGERY
Quality 110: Preventive Care And Screening: Influenza Immunization: Influenza Immunization not Administered because Patient Refused. Detail Level: Detailed Simple / Intermediate / Complex Repair - Final Wound Length In Cm: 0

## 2023-04-18 ENCOUNTER — APPOINTMENT (RX ONLY)
Dept: URBAN - METROPOLITAN AREA CLINIC 58 | Facility: CLINIC | Age: 63
Setting detail: DERMATOLOGY
End: 2023-04-18

## 2023-04-18 DIAGNOSIS — L57.8 OTHER SKIN CHANGES DUE TO CHRONIC EXPOSURE TO NONIONIZING RADIATION: ICD-10-CM | Status: INADEQUATELY CONTROLLED

## 2023-04-18 DIAGNOSIS — L57.0 ACTINIC KERATOSIS: ICD-10-CM | Status: INADEQUATELY CONTROLLED

## 2023-04-18 PROCEDURE — ? ADDITIONAL NOTES

## 2023-04-18 PROCEDURE — ? COUNSELING

## 2023-04-18 PROCEDURE — ? LIQUID NITROGEN

## 2023-04-18 PROCEDURE — 99212 OFFICE O/P EST SF 10 MIN: CPT | Mod: 25

## 2023-04-18 PROCEDURE — 17004 DESTROY PREMAL LESIONS 15/>: CPT

## 2023-04-18 ASSESSMENT — LOCATION DETAILED DESCRIPTION DERM
LOCATION DETAILED: NASAL DORSUM
LOCATION DETAILED: RIGHT CENTRAL MANDIBULAR CHEEK
LOCATION DETAILED: RIGHT LATERAL MALAR CHEEK
LOCATION DETAILED: LEFT CENTRAL MALAR CHEEK
LOCATION DETAILED: RIGHT SUPERIOR LATERAL MALAR CHEEK
LOCATION DETAILED: LEFT INFERIOR CENTRAL MALAR CHEEK
LOCATION DETAILED: RIGHT CENTRAL LATERAL NECK
LOCATION DETAILED: RIGHT INFERIOR CENTRAL MALAR CHEEK
LOCATION DETAILED: LEFT INFERIOR LATERAL MALAR CHEEK
LOCATION DETAILED: LEFT CENTRAL BUCCAL CHEEK
LOCATION DETAILED: LEFT CENTRAL ZYGOMA
LOCATION DETAILED: LEFT MID PREAURICULAR CHEEK
LOCATION DETAILED: RIGHT SUPERIOR CENTRAL BUCCAL CHEEK

## 2023-04-18 ASSESSMENT — LOCATION SIMPLE DESCRIPTION DERM
LOCATION SIMPLE: RIGHT CHEEK
LOCATION SIMPLE: LEFT ZYGOMA
LOCATION SIMPLE: LEFT CHEEK
LOCATION SIMPLE: NOSE
LOCATION SIMPLE: NECK

## 2023-04-18 ASSESSMENT — LOCATION ZONE DERM
LOCATION ZONE: FACE
LOCATION ZONE: NOSE
LOCATION ZONE: NECK

## 2023-04-18 NOTE — HPI: EVALUATION OF SKIN LESION(S)
Hpi Title: Evaluation of Skin Lesions
How Severe Are Your Spot(S)?: mild
Have Your Spot(S) Been Treated In The Past?: has not been treated
Family Member: Mother
Pt is a 34 yo single male living with mother, unemployed and on disability with a 4 yo son who lives with his mother, with history of schizoaffective disorder, cluster B personality traits, cannabis, alcohol and cocaine substance use disorders, with multiple previous psychiatric hospitalizations, last at [facility] in 1/2023, who is in outpatient treatment with [facility]. Pt in on Abilify Invega, 400 mg monthly, refused the injection which was due on 4/11/23. Pt has history of multiple incarcerations including robbery charge and parole violation. Pt has had history of multiple SAs via overdose and hanging and history of SIB, AH, who requested to come to the hospital after refusing Invega injection when IMT saw pt.. Pt reported worsening depression, suicidal ideation and plan to overdose after having gone to court robbery charges and receiving "bad news". [facility] phone number: [phone].

## 2023-04-18 NOTE — PROCEDURE: ADDITIONAL NOTES
Additional Notes: Discussed treatment with Efudex topical cream. Patient would like to research the medication, then will call the office to notify if she would like to elect that treatment. Patient will be going on a cruise in two weeks.
Render Risk Assessment In Note?: no
Detail Level: Zone

## 2023-05-08 ENCOUNTER — APPOINTMENT (RX ONLY)
Dept: URBAN - METROPOLITAN AREA CLINIC 57 | Facility: CLINIC | Age: 63
Setting detail: DERMATOLOGY
End: 2023-05-08

## 2023-05-08 DIAGNOSIS — L81.4 OTHER MELANIN HYPERPIGMENTATION: ICD-10-CM | Status: STABLE

## 2023-05-08 DIAGNOSIS — Z87.2 PERSONAL HISTORY OF DISEASES OF THE SKIN AND SUBCUTANEOUS TISSUE: ICD-10-CM

## 2023-05-08 DIAGNOSIS — L57.0 ACTINIC KERATOSIS: ICD-10-CM

## 2023-05-08 DIAGNOSIS — Z85.828 PERSONAL HISTORY OF OTHER MALIGNANT NEOPLASM OF SKIN: ICD-10-CM

## 2023-05-08 DIAGNOSIS — D22 MELANOCYTIC NEVI: ICD-10-CM | Status: STABLE

## 2023-05-08 DIAGNOSIS — L57.8 OTHER SKIN CHANGES DUE TO CHRONIC EXPOSURE TO NONIONIZING RADIATION: ICD-10-CM | Status: STABLE

## 2023-05-08 DIAGNOSIS — D18.0 HEMANGIOMA: ICD-10-CM | Status: STABLE

## 2023-05-08 DIAGNOSIS — L82.1 OTHER SEBORRHEIC KERATOSIS: ICD-10-CM | Status: STABLE

## 2023-05-08 PROBLEM — D48.5 NEOPLASM OF UNCERTAIN BEHAVIOR OF SKIN: Status: ACTIVE | Noted: 2023-05-08

## 2023-05-08 PROBLEM — D18.01 HEMANGIOMA OF SKIN AND SUBCUTANEOUS TISSUE: Status: ACTIVE | Noted: 2023-05-08

## 2023-05-08 PROBLEM — D22.5 MELANOCYTIC NEVI OF TRUNK: Status: ACTIVE | Noted: 2023-05-08

## 2023-05-08 PROCEDURE — ? FULL BODY SKIN EXAM

## 2023-05-08 PROCEDURE — 11102 TANGNTL BX SKIN SINGLE LES: CPT

## 2023-05-08 PROCEDURE — ? COUNSELING

## 2023-05-08 PROCEDURE — 99213 OFFICE O/P EST LOW 20 MIN: CPT | Mod: 25

## 2023-05-08 PROCEDURE — ? TREATMENT REGIMEN

## 2023-05-08 PROCEDURE — 17003 DESTRUCT PREMALG LES 2-14: CPT

## 2023-05-08 PROCEDURE — 17000 DESTRUCT PREMALG LESION: CPT | Mod: 59

## 2023-05-08 PROCEDURE — ? LIQUID NITROGEN

## 2023-05-08 PROCEDURE — ? BIOPSY BY SHAVE METHOD

## 2023-05-08 ASSESSMENT — LOCATION SIMPLE DESCRIPTION DERM
LOCATION SIMPLE: RIGHT ZYGOMA
LOCATION SIMPLE: RIGHT CHEEK
LOCATION SIMPLE: UPPER BACK
LOCATION SIMPLE: LEFT HAND
LOCATION SIMPLE: LEFT ZYGOMA
LOCATION SIMPLE: ABDOMEN
LOCATION SIMPLE: NECK
LOCATION SIMPLE: LEFT LOWER BACK
LOCATION SIMPLE: LEFT MIDDLE FINGER
LOCATION SIMPLE: RIGHT UPPER BACK
LOCATION SIMPLE: LEFT FOREHEAD

## 2023-05-08 ASSESSMENT — LOCATION ZONE DERM
LOCATION ZONE: NECK
LOCATION ZONE: FACE
LOCATION ZONE: FINGER
LOCATION ZONE: TRUNK
LOCATION ZONE: HAND

## 2023-05-08 ASSESSMENT — LOCATION DETAILED DESCRIPTION DERM
LOCATION DETAILED: PERIUMBILICAL SKIN
LOCATION DETAILED: INFERIOR THORACIC SPINE
LOCATION DETAILED: LEFT INFERIOR MEDIAL FOREHEAD
LOCATION DETAILED: LEFT SUPERIOR MEDIAL MIDBACK
LOCATION DETAILED: LEFT CENTRAL ZYGOMA
LOCATION DETAILED: RIGHT INFERIOR UPPER BACK
LOCATION DETAILED: RIGHT MEDIAL ZYGOMA
LOCATION DETAILED: LEFT RADIAL DORSAL HAND
LOCATION DETAILED: EPIGASTRIC SKIN
LOCATION DETAILED: LEFT CENTRAL LATERAL NECK
LOCATION DETAILED: LEFT PROXIMAL DORSAL MIDDLE FINGER
LOCATION DETAILED: RIGHT INFERIOR CENTRAL MALAR CHEEK
LOCATION DETAILED: SUPERIOR THORACIC SPINE
LOCATION DETAILED: RIGHT SUPERIOR LATERAL NECK

## 2023-05-08 NOTE — HPI: EVALUATION OF SKIN LESION(S)
What Type Of Note Output Would You Prefer (Optional)?: Standard Output
Hpi Title: Evaluation of Skin Lesions
How Severe Are Your Spot(S)?: mild
Have Your Spot(S) Been Treated In The Past?: has not been treated
Additional History: Spots of concern on face, neck and right shoulder

## 2023-10-30 ENCOUNTER — APPOINTMENT (RX ONLY)
Dept: URBAN - METROPOLITAN AREA CLINIC 57 | Facility: CLINIC | Age: 63
Setting detail: DERMATOLOGY
End: 2023-10-30

## 2023-10-30 DIAGNOSIS — D22 MELANOCYTIC NEVI: ICD-10-CM

## 2023-10-30 DIAGNOSIS — D17 BENIGN LIPOMATOUS NEOPLASM: ICD-10-CM | Status: STABLE

## 2023-10-30 DIAGNOSIS — L57.8 OTHER SKIN CHANGES DUE TO CHRONIC EXPOSURE TO NONIONIZING RADIATION: ICD-10-CM

## 2023-10-30 DIAGNOSIS — D18.0 HEMANGIOMA: ICD-10-CM

## 2023-10-30 DIAGNOSIS — L81.4 OTHER MELANIN HYPERPIGMENTATION: ICD-10-CM

## 2023-10-30 DIAGNOSIS — L82.1 OTHER SEBORRHEIC KERATOSIS: ICD-10-CM

## 2023-10-30 DIAGNOSIS — Z85.828 PERSONAL HISTORY OF OTHER MALIGNANT NEOPLASM OF SKIN: ICD-10-CM

## 2023-10-30 DIAGNOSIS — L57.0 ACTINIC KERATOSIS: ICD-10-CM | Status: INADEQUATELY CONTROLLED

## 2023-10-30 PROBLEM — D22.62 MELANOCYTIC NEVI OF LEFT UPPER LIMB, INCLUDING SHOULDER: Status: ACTIVE | Noted: 2023-10-30

## 2023-10-30 PROBLEM — D18.01 HEMANGIOMA OF SKIN AND SUBCUTANEOUS TISSUE: Status: ACTIVE | Noted: 2023-10-30

## 2023-10-30 PROBLEM — D17.23 BENIGN LIPOMATOUS NEOPLASM OF SKIN AND SUBCUTANEOUS TISSUE OF RIGHT LEG: Status: ACTIVE | Noted: 2023-10-30

## 2023-10-30 PROCEDURE — 99213 OFFICE O/P EST LOW 20 MIN: CPT | Mod: 25

## 2023-10-30 PROCEDURE — ? FULL BODY SKIN EXAM

## 2023-10-30 PROCEDURE — ? TREATMENT REGIMEN

## 2023-10-30 PROCEDURE — 17004 DESTROY PREMAL LESIONS 15/>: CPT

## 2023-10-30 PROCEDURE — ? COUNSELING

## 2023-10-30 PROCEDURE — ? LIQUID NITROGEN

## 2023-10-30 ASSESSMENT — LOCATION ZONE DERM
LOCATION ZONE: FACE
LOCATION ZONE: SCALP
LOCATION ZONE: EAR
LOCATION ZONE: NOSE
LOCATION ZONE: HAND
LOCATION ZONE: LEG
LOCATION ZONE: FINGER

## 2023-10-30 ASSESSMENT — LOCATION DETAILED DESCRIPTION DERM
LOCATION DETAILED: RIGHT SUPERIOR FOREHEAD
LOCATION DETAILED: LEFT DORSAL MIDDLE METACARPOPHALANGEAL JOINT
LOCATION DETAILED: LEFT SUPERIOR CENTRAL BUCCAL CHEEK
LOCATION DETAILED: LEFT SUPERIOR CRUS OF ANTIHELIX
LOCATION DETAILED: LEFT SUPERIOR FOREHEAD
LOCATION DETAILED: LEFT MEDIAL TEMPLE
LOCATION DETAILED: LEFT SUPERIOR MEDIAL FOREHEAD
LOCATION DETAILED: LEFT PROXIMAL DORSAL INDEX FINGER
LOCATION DETAILED: LEFT LATERAL FOREHEAD
LOCATION DETAILED: RIGHT ANTERIOR PROXIMAL THIGH
LOCATION DETAILED: LEFT RADIAL DORSAL HAND
LOCATION DETAILED: LEFT MEDIAL FRONTAL SCALP
LOCATION DETAILED: LEFT INFERIOR MEDIAL FOREHEAD
LOCATION DETAILED: NASAL DORSUM
LOCATION DETAILED: LEFT CENTRAL ZYGOMA

## 2023-10-30 ASSESSMENT — LOCATION SIMPLE DESCRIPTION DERM
LOCATION SIMPLE: NOSE
LOCATION SIMPLE: LEFT EAR
LOCATION SIMPLE: LEFT TEMPLE
LOCATION SIMPLE: LEFT HAND
LOCATION SIMPLE: LEFT SCALP
LOCATION SIMPLE: LEFT CHEEK
LOCATION SIMPLE: RIGHT FOREHEAD
LOCATION SIMPLE: LEFT FOREHEAD
LOCATION SIMPLE: LEFT INDEX FINGER
LOCATION SIMPLE: RIGHT THIGH
LOCATION SIMPLE: LEFT ZYGOMA

## 2023-11-13 ENCOUNTER — COMPREHENSIVE EXAM (OUTPATIENT)
Dept: URBAN - METROPOLITAN AREA CLINIC 48 | Facility: LOCATION | Age: 63
End: 2023-11-13

## 2023-11-13 DIAGNOSIS — H52.4: ICD-10-CM

## 2023-11-13 DIAGNOSIS — H04.123: ICD-10-CM

## 2023-11-13 DIAGNOSIS — H40.013: ICD-10-CM

## 2023-11-13 DIAGNOSIS — H35.3111: ICD-10-CM

## 2023-11-13 DIAGNOSIS — H25.813: ICD-10-CM

## 2023-11-13 DIAGNOSIS — H43.813: ICD-10-CM

## 2023-11-13 DIAGNOSIS — H35.362: ICD-10-CM

## 2023-11-13 DIAGNOSIS — Z01.01: ICD-10-CM

## 2023-11-13 PROCEDURE — 92134 CPTRZ OPH DX IMG PST SGM RTA: CPT

## 2023-11-13 PROCEDURE — 92015 DETERMINE REFRACTIVE STATE: CPT

## 2023-11-13 PROCEDURE — 92014 COMPRE OPH EXAM EST PT 1/>: CPT

## 2023-11-13 ASSESSMENT — VISUAL ACUITY
OS_GLARE: 20/25
OS_GLARE: 20/30
OD_CC: 20/25
OS_CC: 20/40
OU_CC: J1+@16IN
OS_PH: 20/25-2
OD_GLARE: 20/20

## 2023-11-13 ASSESSMENT — TONOMETRY
OD_IOP_MMHG: 10
OD_IOP_MMHG: 16
OS_IOP_MMHG: 10
OS_IOP_MMHG: 15

## 2024-05-07 ENCOUNTER — APPOINTMENT (RX ONLY)
Dept: URBAN - METROPOLITAN AREA CLINIC 57 | Facility: CLINIC | Age: 64
Setting detail: DERMATOLOGY
End: 2024-05-07

## 2024-05-07 DIAGNOSIS — L57.8 OTHER SKIN CHANGES DUE TO CHRONIC EXPOSURE TO NONIONIZING RADIATION: ICD-10-CM

## 2024-05-07 DIAGNOSIS — L57.0 ACTINIC KERATOSIS: ICD-10-CM | Status: INADEQUATELY CONTROLLED

## 2024-05-07 DIAGNOSIS — L82.1 OTHER SEBORRHEIC KERATOSIS: ICD-10-CM

## 2024-05-07 DIAGNOSIS — D22 MELANOCYTIC NEVI: ICD-10-CM

## 2024-05-07 DIAGNOSIS — D18.0 HEMANGIOMA: ICD-10-CM

## 2024-05-07 DIAGNOSIS — L81.4 OTHER MELANIN HYPERPIGMENTATION: ICD-10-CM

## 2024-05-07 DIAGNOSIS — Z85.828 PERSONAL HISTORY OF OTHER MALIGNANT NEOPLASM OF SKIN: ICD-10-CM

## 2024-05-07 PROBLEM — D22.62 MELANOCYTIC NEVI OF LEFT UPPER LIMB, INCLUDING SHOULDER: Status: ACTIVE | Noted: 2024-05-07

## 2024-05-07 PROBLEM — D18.01 HEMANGIOMA OF SKIN AND SUBCUTANEOUS TISSUE: Status: ACTIVE | Noted: 2024-05-07

## 2024-05-07 PROCEDURE — ? TREATMENT REGIMEN

## 2024-05-07 PROCEDURE — ? LIQUID NITROGEN

## 2024-05-07 PROCEDURE — 99213 OFFICE O/P EST LOW 20 MIN: CPT | Mod: 25

## 2024-05-07 PROCEDURE — 17000 DESTRUCT PREMALG LESION: CPT

## 2024-05-07 PROCEDURE — ? COUNSELING

## 2024-05-07 PROCEDURE — ? FULL BODY SKIN EXAM

## 2024-05-07 PROCEDURE — 17003 DESTRUCT PREMALG LES 2-14: CPT

## 2024-05-07 ASSESSMENT — LOCATION SIMPLE DESCRIPTION DERM
LOCATION SIMPLE: LEFT FOREARM
LOCATION SIMPLE: LEFT PRETIBIAL REGION
LOCATION SIMPLE: RIGHT POSTERIOR UPPER ARM
LOCATION SIMPLE: RIGHT ZYGOMA
LOCATION SIMPLE: LEFT FOREHEAD
LOCATION SIMPLE: LEFT HAND
LOCATION SIMPLE: LEFT INDEX FINGER
LOCATION SIMPLE: LEFT CHEEK
LOCATION SIMPLE: RIGHT CHEEK

## 2024-05-07 ASSESSMENT — LOCATION DETAILED DESCRIPTION DERM
LOCATION DETAILED: LEFT FOREHEAD
LOCATION DETAILED: RIGHT INFERIOR MEDIAL MALAR CHEEK
LOCATION DETAILED: LEFT DORSAL MIDDLE METACARPOPHALANGEAL JOINT
LOCATION DETAILED: LEFT INFERIOR CENTRAL MALAR CHEEK
LOCATION DETAILED: RIGHT DISTAL POSTERIOR UPPER ARM
LOCATION DETAILED: LEFT PROXIMAL DORSAL FOREARM
LOCATION DETAILED: LEFT CENTRAL BUCCAL CHEEK
LOCATION DETAILED: LEFT INFERIOR MEDIAL MALAR CHEEK
LOCATION DETAILED: LEFT SUPERIOR CENTRAL BUCCAL CHEEK
LOCATION DETAILED: LEFT PROXIMAL DORSAL INDEX FINGER
LOCATION DETAILED: LEFT RADIAL DORSAL HAND
LOCATION DETAILED: LEFT PROXIMAL PRETIBIAL REGION
LOCATION DETAILED: RIGHT MEDIAL ZYGOMA

## 2024-05-07 ASSESSMENT — LOCATION ZONE DERM
LOCATION ZONE: ARM
LOCATION ZONE: FINGER
LOCATION ZONE: FACE
LOCATION ZONE: HAND
LOCATION ZONE: LEG

## 2024-05-07 NOTE — PROCEDURE: LIQUID NITROGEN
Consent: The patient's consent was obtained including but not limited to risks of crusting, scabbing, blistering, scarring, darker or lighter pigmentary change, recurrence, incomplete removal and infection.
Render Note In Bullet Format When Appropriate: No
Number Of Freeze-Thaw Cycles: 1 freeze-thaw cycle
Show Applicator Variable?: Yes
Detail Level: Detailed
Post-Care Instructions: I reviewed with the patient in detail post-care instructions. Patient is to wear sunprotection, and avoid picking at any of the treated lesions. Pt may apply Vaseline to crusted or scabbing areas.
Duration Of Freeze Thaw-Cycle (Seconds): 2

## 2024-05-20 ENCOUNTER — APPOINTMENT (RX ONLY)
Dept: URBAN - METROPOLITAN AREA CLINIC 57 | Facility: CLINIC | Age: 64
Setting detail: DERMATOLOGY
End: 2024-05-20

## 2024-05-20 DIAGNOSIS — Z85.828 PERSONAL HISTORY OF OTHER MALIGNANT NEOPLASM OF SKIN: ICD-10-CM

## 2024-05-20 DIAGNOSIS — L82.1 OTHER SEBORRHEIC KERATOSIS: ICD-10-CM | Status: STABLE

## 2024-05-20 DIAGNOSIS — L82.0 INFLAMED SEBORRHEIC KERATOSIS: ICD-10-CM

## 2024-05-20 PROCEDURE — ? LIQUID NITROGEN

## 2024-05-20 PROCEDURE — 17110 DESTRUCTION B9 LES UP TO 14: CPT

## 2024-05-20 PROCEDURE — ? COUNSELING

## 2024-05-20 PROCEDURE — ? FULL BODY SKIN EXAM

## 2024-05-20 PROCEDURE — 99212 OFFICE O/P EST SF 10 MIN: CPT | Mod: 25

## 2024-05-20 ASSESSMENT — LOCATION DETAILED DESCRIPTION DERM: LOCATION DETAILED: LEFT PROXIMAL PRETIBIAL REGION

## 2024-05-20 ASSESSMENT — LOCATION SIMPLE DESCRIPTION DERM: LOCATION SIMPLE: LEFT PRETIBIAL REGION

## 2024-05-20 ASSESSMENT — LOCATION ZONE DERM: LOCATION ZONE: LEG

## 2024-05-20 NOTE — PROCEDURE: LIQUID NITROGEN
Render Post-Care Instructions In Note?: no
Show Aperture Variable?: Yes
Number Of Freeze-Thaw Cycles: 2 freeze-thaw cycles
Medical Necessity Clause: This procedure was medically necessary because the lesions that were treated were:
Detail Level: Detailed
Duration Of Freeze Thaw-Cycle (Seconds): 8
Post-Care Instructions: I reviewed with the patient in detail post-care instructions. Patient is to wear sunprotection, and avoid picking at any of the treated lesions. Pt may apply Vaseline to crusted or scabbing areas.
Spray Paint Text: The liquid nitrogen was applied to the skin utilizing a spray paint frosting technique.
Medical Necessity Information: It is in your best interest to select a reason for this procedure from the list below. All of these items fulfill various CMS LCD requirements except the new and changing color options.
Consent: The patient's consent was obtained including but not limited to risks of crusting, scabbing, blistering, scarring, darker or lighter pigmentary change, recurrence, incomplete removal and infection.

## 2024-11-12 ENCOUNTER — APPOINTMENT (RX ONLY)
Dept: URBAN - METROPOLITAN AREA CLINIC 57 | Facility: CLINIC | Age: 64
Setting detail: DERMATOLOGY
End: 2024-11-12

## 2024-11-12 DIAGNOSIS — L57.0 ACTINIC KERATOSIS: ICD-10-CM | Status: INADEQUATELY CONTROLLED

## 2024-11-12 DIAGNOSIS — L81.4 OTHER MELANIN HYPERPIGMENTATION: ICD-10-CM

## 2024-11-12 DIAGNOSIS — L57.8 OTHER SKIN CHANGES DUE TO CHRONIC EXPOSURE TO NONIONIZING RADIATION: ICD-10-CM

## 2024-11-12 DIAGNOSIS — Z85.828 PERSONAL HISTORY OF OTHER MALIGNANT NEOPLASM OF SKIN: ICD-10-CM

## 2024-11-12 DIAGNOSIS — L71.8 OTHER ROSACEA: ICD-10-CM | Status: STABLE

## 2024-11-12 DIAGNOSIS — L82.1 OTHER SEBORRHEIC KERATOSIS: ICD-10-CM

## 2024-11-12 DIAGNOSIS — D18.0 HEMANGIOMA: ICD-10-CM

## 2024-11-12 DIAGNOSIS — D22 MELANOCYTIC NEVI: ICD-10-CM

## 2024-11-12 PROBLEM — D18.01 HEMANGIOMA OF SKIN AND SUBCUTANEOUS TISSUE: Status: ACTIVE | Noted: 2024-11-12

## 2024-11-12 PROBLEM — D22.62 MELANOCYTIC NEVI OF LEFT UPPER LIMB, INCLUDING SHOULDER: Status: ACTIVE | Noted: 2024-11-12

## 2024-11-12 PROCEDURE — ? FULL BODY SKIN EXAM

## 2024-11-12 PROCEDURE — 17003 DESTRUCT PREMALG LES 2-14: CPT

## 2024-11-12 PROCEDURE — 17000 DESTRUCT PREMALG LESION: CPT

## 2024-11-12 PROCEDURE — 99213 OFFICE O/P EST LOW 20 MIN: CPT | Mod: 25

## 2024-11-12 PROCEDURE — ? TREATMENT REGIMEN

## 2024-11-12 PROCEDURE — ? LIQUID NITROGEN

## 2024-11-12 PROCEDURE — ? COUNSELING

## 2024-11-12 ASSESSMENT — LOCATION SIMPLE DESCRIPTION DERM
LOCATION SIMPLE: RIGHT CHEEK
LOCATION SIMPLE: CHEST
LOCATION SIMPLE: LEFT HAND
LOCATION SIMPLE: LEFT INDEX FINGER
LOCATION SIMPLE: LEFT CHEEK

## 2024-11-12 ASSESSMENT — LOCATION DETAILED DESCRIPTION DERM
LOCATION DETAILED: RIGHT INFERIOR CENTRAL MALAR CHEEK
LOCATION DETAILED: LEFT DORSAL MIDDLE METACARPOPHALANGEAL JOINT
LOCATION DETAILED: RIGHT CENTRAL MALAR CHEEK
LOCATION DETAILED: LEFT PROXIMAL DORSAL INDEX FINGER
LOCATION DETAILED: RIGHT SUPERIOR MEDIAL BUCCAL CHEEK
LOCATION DETAILED: LEFT INFERIOR MEDIAL MALAR CHEEK
LOCATION DETAILED: STERNAL NOTCH
LOCATION DETAILED: LEFT RADIAL DORSAL HAND

## 2024-11-12 ASSESSMENT — LOCATION ZONE DERM
LOCATION ZONE: FACE
LOCATION ZONE: FINGER
LOCATION ZONE: HAND
LOCATION ZONE: TRUNK

## 2024-11-19 ENCOUNTER — DIAGNOSTICS ONLY (OUTPATIENT)
Dept: URBAN - METROPOLITAN AREA CLINIC 53 | Facility: CLINIC | Age: 64
End: 2024-11-19

## 2024-11-19 DIAGNOSIS — H40.013: ICD-10-CM

## 2024-11-19 PROCEDURE — 92133 CPTRZD OPH DX IMG PST SGM ON: CPT

## 2024-11-19 PROCEDURE — 92083 EXTENDED VISUAL FIELD XM: CPT

## 2024-11-20 ENCOUNTER — COMPREHENSIVE EXAM (OUTPATIENT)
Dept: URBAN - METROPOLITAN AREA CLINIC 53 | Facility: CLINIC | Age: 64
End: 2024-11-20

## 2024-11-20 DIAGNOSIS — H40.013: ICD-10-CM

## 2024-11-20 DIAGNOSIS — Z01.01: ICD-10-CM

## 2024-11-20 DIAGNOSIS — H04.123: ICD-10-CM

## 2024-11-20 DIAGNOSIS — H43.813: ICD-10-CM

## 2024-11-20 DIAGNOSIS — H35.362: ICD-10-CM

## 2024-11-20 DIAGNOSIS — H25.813: ICD-10-CM

## 2024-11-20 DIAGNOSIS — H52.4: ICD-10-CM

## 2024-11-20 DIAGNOSIS — H35.3111: ICD-10-CM

## 2024-11-20 PROCEDURE — 92014 COMPRE OPH EXAM EST PT 1/>: CPT

## 2024-11-20 PROCEDURE — 92015 DETERMINE REFRACTIVE STATE: CPT

## 2024-11-20 PROCEDURE — 92134 CPTRZ OPH DX IMG PST SGM RTA: CPT

## 2025-05-13 ENCOUNTER — APPOINTMENT (OUTPATIENT)
Age: 65
Setting detail: DERMATOLOGY
End: 2025-05-13

## 2025-05-13 DIAGNOSIS — L81.4 OTHER MELANIN HYPERPIGMENTATION: ICD-10-CM

## 2025-05-13 DIAGNOSIS — L82.1 OTHER SEBORRHEIC KERATOSIS: ICD-10-CM

## 2025-05-13 DIAGNOSIS — D22 MELANOCYTIC NEVI: ICD-10-CM

## 2025-05-13 DIAGNOSIS — Z85.828 PERSONAL HISTORY OF OTHER MALIGNANT NEOPLASM OF SKIN: ICD-10-CM

## 2025-05-13 DIAGNOSIS — D18.0 HEMANGIOMA: ICD-10-CM

## 2025-05-13 DIAGNOSIS — L57.8 OTHER SKIN CHANGES DUE TO CHRONIC EXPOSURE TO NONIONIZING RADIATION: ICD-10-CM

## 2025-05-13 DIAGNOSIS — L57.0 ACTINIC KERATOSIS: ICD-10-CM | Status: INADEQUATELY CONTROLLED

## 2025-05-13 PROBLEM — D18.01 HEMANGIOMA OF SKIN AND SUBCUTANEOUS TISSUE: Status: ACTIVE | Noted: 2025-05-13

## 2025-05-13 PROBLEM — D22.62 MELANOCYTIC NEVI OF LEFT UPPER LIMB, INCLUDING SHOULDER: Status: ACTIVE | Noted: 2025-05-13

## 2025-05-13 PROCEDURE — ? TREATMENT REGIMEN

## 2025-05-13 PROCEDURE — ? LIQUID NITROGEN

## 2025-05-13 PROCEDURE — 17003 DESTRUCT PREMALG LES 2-14: CPT

## 2025-05-13 PROCEDURE — ? COUNSELING

## 2025-05-13 PROCEDURE — 99213 OFFICE O/P EST LOW 20 MIN: CPT | Mod: 25

## 2025-05-13 PROCEDURE — ? FULL BODY SKIN EXAM

## 2025-05-13 PROCEDURE — 17000 DESTRUCT PREMALG LESION: CPT

## 2025-05-13 ASSESSMENT — LOCATION DETAILED DESCRIPTION DERM
LOCATION DETAILED: LEFT DORSAL MIDDLE METACARPOPHALANGEAL JOINT
LOCATION DETAILED: RIGHT CENTRAL MALAR CHEEK
LOCATION DETAILED: LEFT RADIAL DORSAL HAND
LOCATION DETAILED: LEFT PROXIMAL DORSAL INDEX FINGER
LOCATION DETAILED: NASAL DORSUM
LOCATION DETAILED: RIGHT LATERAL MALAR CHEEK
LOCATION DETAILED: RIGHT SUPERIOR CENTRAL BUCCAL CHEEK
LOCATION DETAILED: LEFT INFERIOR CENTRAL MALAR CHEEK
LOCATION DETAILED: LEFT MEDIAL MALAR CHEEK

## 2025-05-13 ASSESSMENT — LOCATION ZONE DERM
LOCATION ZONE: NOSE
LOCATION ZONE: FINGER
LOCATION ZONE: HAND
LOCATION ZONE: FACE

## 2025-05-13 ASSESSMENT — LOCATION SIMPLE DESCRIPTION DERM
LOCATION SIMPLE: LEFT HAND
LOCATION SIMPLE: NOSE
LOCATION SIMPLE: LEFT CHEEK
LOCATION SIMPLE: RIGHT CHEEK
LOCATION SIMPLE: LEFT INDEX FINGER

## 2025-05-13 ASSESSMENT — PAIN INTENSITY VAS: HOW INTENSE IS YOUR PAIN 0 BEING NO PAIN, 10 BEING THE MOST SEVERE PAIN POSSIBLE?: NO PAIN

## 2025-05-13 NOTE — PROCEDURE: LIQUID NITROGEN
Detail Level: Detailed
Render Note In Bullet Format When Appropriate: No
Post-Care Instructions: I reviewed with the patient in detail post-care instructions. Patient is to wear sunprotection, and avoid picking at any of the treated lesions. Pt may apply Vaseline to crusted or scabbing areas.
Number Of Freeze-Thaw Cycles: 1 freeze-thaw cycle
Consent: The patient's consent was obtained including but not limited to risks of crusting, scabbing, blistering, scarring, darker or lighter pigmentary change, recurrence, incomplete removal and infection.
Show Aperture Variable?: Yes
Duration Of Freeze Thaw-Cycle (Seconds): 2

## 2025-08-19 ENCOUNTER — APPOINTMENT (OUTPATIENT)
Age: 65
Setting detail: DERMATOLOGY
End: 2025-08-19

## 2025-08-19 DIAGNOSIS — L57.0 ACTINIC KERATOSIS: ICD-10-CM | Status: INADEQUATELY CONTROLLED

## 2025-08-19 DIAGNOSIS — Z85.828 PERSONAL HISTORY OF OTHER MALIGNANT NEOPLASM OF SKIN: ICD-10-CM

## 2025-08-19 PROBLEM — D48.5 NEOPLASM OF UNCERTAIN BEHAVIOR OF SKIN: Status: ACTIVE | Noted: 2025-08-19

## 2025-08-19 PROCEDURE — ? LIQUID NITROGEN

## 2025-08-19 PROCEDURE — ? COUNSELING

## 2025-08-19 PROCEDURE — ? BIOPSY BY SHAVE METHOD

## 2025-08-19 ASSESSMENT — LOCATION SIMPLE DESCRIPTION DERM
LOCATION SIMPLE: LEFT CHEEK
LOCATION SIMPLE: LEFT TEMPLE
LOCATION SIMPLE: RIGHT CHEEK
LOCATION SIMPLE: LEFT FOREHEAD

## 2025-08-19 ASSESSMENT — LOCATION DETAILED DESCRIPTION DERM
LOCATION DETAILED: RIGHT INFERIOR CENTRAL MALAR CHEEK
LOCATION DETAILED: LEFT LATERAL BUCCAL CHEEK
LOCATION DETAILED: LEFT CENTRAL TEMPLE
LOCATION DETAILED: LEFT SUPERIOR CENTRAL BUCCAL CHEEK
LOCATION DETAILED: LEFT INFERIOR FOREHEAD

## 2025-08-19 ASSESSMENT — LOCATION ZONE DERM: LOCATION ZONE: FACE
